# Patient Record
Sex: FEMALE | Race: BLACK OR AFRICAN AMERICAN | NOT HISPANIC OR LATINO | URBAN - METROPOLITAN AREA
[De-identification: names, ages, dates, MRNs, and addresses within clinical notes are randomized per-mention and may not be internally consistent; named-entity substitution may affect disease eponyms.]

---

## 2019-02-19 NOTE — PATIENT DISCUSSION
OPTIC HEMORRHAGE OS - MAYBE DO TO PLATELETS. HYPEREMIA OPTIC DISC OD - DISC PHOTO TAKEN TODAY. WILL DILATE AT NEXT VISIT.

## 2019-03-26 NOTE — PATIENT DISCUSSION
POSTERIOR VITREOUS DETACHMENT, OS: NO HOLES OR TEARS 360' WITH INDIRECT EXAM OU. RETURN FOR FOLLOW-UP AS SCHEDULED.

## 2019-04-16 NOTE — PATIENT DISCUSSION
POSTERIOR VITREOUS DETACHMENT, OS: STABLE. NO HOLES OR TEARS 360' WITH INDIRECT EXAM OU. RETURN FOR FOLLOW-UP AS SCHEDULED.

## 2019-07-26 NOTE — PATIENT DISCUSSION
POSTERIOR VITREOUS DETACHMENT, OU: NO RETINAL HOLES OR TEARS ON SLIT LAMP. CALLBACK INSTRUCTIONS GIVEN. RETURN FOR FOLLOW-UP AS SCHEDULED.

## 2019-07-26 NOTE — PATIENT DISCUSSION
MODERATE DRY EYES: PRESCRIBED PRESERVATIVE FREE ARTIFICIAL TEARS 4-6X A DAY, OU AND THE DAILY INTAKE OF OMEGA-3 DHA/EPA FATTY ACIDS TO HELP RELIEVE SYMPTOMS. ADD NIGHTLY LUBRICATING OINTMENT OR GEL.

## 2021-06-14 ENCOUNTER — IMPORTED ENCOUNTER (OUTPATIENT)
Dept: URBAN - METROPOLITAN AREA CLINIC 9 | Facility: CLINIC | Age: 76
End: 2021-06-14

## 2021-06-23 ENCOUNTER — IMPORTED ENCOUNTER (OUTPATIENT)
Dept: URBAN - METROPOLITAN AREA CLINIC 9 | Facility: CLINIC | Age: 76
End: 2021-06-23

## 2021-06-30 ENCOUNTER — IMPORTED ENCOUNTER (OUTPATIENT)
Dept: URBAN - METROPOLITAN AREA CLINIC 9 | Facility: CLINIC | Age: 76
End: 2021-06-30

## 2021-10-16 ASSESSMENT — VISUAL ACUITY
OD_CC: 20/70 SN
OD_CC: 20/200 SN
OS_CC: 20/70 SN
OS_CC: 20/200 SN
OD_SC: 20/30 SN
OD_PH: 20/100 SN

## 2021-10-16 ASSESSMENT — TONOMETRY
OD_IOP_MMHG: 14
OD_IOP_MMHG: 15
OS_IOP_MMHG: 14

## 2021-10-16 ASSESSMENT — KERATOMETRY
OD_K1POWER_DIOPTERS: 43.25
OD_K2POWER_DIOPTERS: 43.75
OS_K2POWER_DIOPTERS: 44
OS_AXISANGLE_DEGREES: 50
OD_AXISANGLE2_DEGREES: 8
OS_AXISANGLE2_DEGREES: 140
OS_K1POWER_DIOPTERS: 43.75
OD_AXISANGLE_DEGREES: 98

## 2022-05-09 ENCOUNTER — ESTABLISHED PATIENT (OUTPATIENT)
Dept: URBAN - METROPOLITAN AREA CLINIC 14 | Facility: CLINIC | Age: 77
End: 2022-05-09

## 2022-05-09 DIAGNOSIS — H25.12: ICD-10-CM

## 2022-05-09 PROCEDURE — 99214 OFFICE O/P EST MOD 30 MIN: CPT

## 2022-05-09 PROCEDURE — 92136 OPHTHALMIC BIOMETRY: CPT

## 2022-05-09 ASSESSMENT — VISUAL ACUITY
OS_PH: 20/60
OS_BCVA: 20/80
OD_BCVA: 20/20
OS_SC: 20/80
OS_SC: J10
OD_SC: 20/40
OD_SC: J5

## 2022-05-09 ASSESSMENT — KERATOMETRY
OD_AXISANGLE_DEGREES: 90
OS_K1POWER_DIOPTERS: 45.00
OD_K1POWER_DIOPTERS: 43.50
OS_AXISANGLE2_DEGREES: 91
OD_AXISANGLE2_DEGREES: 180
OS_K2POWER_DIOPTERS: 45.25
OD_K2POWER_DIOPTERS: 43.75
OS_AXISANGLE_DEGREES: 1

## 2022-05-09 ASSESSMENT — TONOMETRY
OS_IOP_MMHG: 7
OD_IOP_MMHG: 6

## 2022-06-16 ENCOUNTER — POST-OP (OUTPATIENT)
Dept: URBAN - METROPOLITAN AREA CLINIC 14 | Facility: CLINIC | Age: 77
End: 2022-06-16

## 2022-06-16 DIAGNOSIS — Z96.1: ICD-10-CM

## 2022-06-16 PROCEDURE — 99024 POSTOP FOLLOW-UP VISIT: CPT

## 2022-06-16 ASSESSMENT — KERATOMETRY
OD_K2POWER_DIOPTERS: 43.75
OS_AXISANGLE2_DEGREES: 91
OS_AXISANGLE_DEGREES: 1
OS_K2POWER_DIOPTERS: 45.25
OS_K1POWER_DIOPTERS: 45.00
OD_K1POWER_DIOPTERS: 43.50
OD_AXISANGLE2_DEGREES: 180
OD_AXISANGLE_DEGREES: 90

## 2022-06-16 ASSESSMENT — VISUAL ACUITY
OS_SC: 20/20
OD_SC: 20/30-1

## 2022-06-16 ASSESSMENT — TONOMETRY
OS_IOP_MMHG: 14
OD_IOP_MMHG: 12

## 2022-07-04 RX ORDER — QUETIAPINE FUMARATE 25 MG/1
TABLET, FILM COATED ORAL
COMMUNITY
End: 2022-08-04 | Stop reason: SDUPTHER

## 2022-07-04 RX ORDER — ROSUVASTATIN CALCIUM 40 MG/1
TABLET, COATED ORAL
COMMUNITY
End: 2022-10-20

## 2022-07-04 RX ORDER — DIVALPROEX SODIUM 500 MG/1
TABLET, EXTENDED RELEASE ORAL
COMMUNITY
End: 2022-08-04 | Stop reason: SDUPTHER

## 2022-07-13 PROBLEM — H40.9 GLAUCOMA OF BOTH EYES: Status: ACTIVE | Noted: 2022-07-13

## 2022-07-13 PROBLEM — S82.853A CLOSED TRIMALLEOLAR FRACTURE: Status: ACTIVE | Noted: 2022-07-13

## 2022-07-13 PROBLEM — F31.70 BIPOLAR DISORDER IN FULL REMISSION (HCC): Status: ACTIVE | Noted: 2022-07-13

## 2022-07-13 PROBLEM — E78.00 PURE HYPERCHOLESTEROLEMIA: Status: ACTIVE | Noted: 2022-07-13

## 2022-07-13 PROBLEM — M65.4 DE QUERVAIN'S TENOSYNOVITIS, LEFT: Status: ACTIVE | Noted: 2022-07-13

## 2022-07-13 PROBLEM — D64.9 ABSOLUTE ANEMIA: Status: ACTIVE | Noted: 2022-07-13

## 2022-07-13 PROBLEM — E03.8 SUBCLINICAL HYPOTHYROIDISM: Status: ACTIVE | Noted: 2022-07-13

## 2022-07-13 PROBLEM — E11.9 TYPE 2 DIABETES MELLITUS WITHOUT COMPLICATION, WITHOUT LONG-TERM CURRENT USE OF INSULIN (HCC): Status: ACTIVE | Noted: 2022-07-13

## 2022-07-13 PROBLEM — D50.9 MICROCYTIC ANEMIA: Status: ACTIVE | Noted: 2022-07-13

## 2022-07-13 PROBLEM — F31.9 BIPOLAR 1 DISORDER (HCC): Status: ACTIVE | Noted: 2022-07-13

## 2022-08-17 PROBLEM — S82.853A TRIMALLEOLAR FRACTURE: Status: ACTIVE | Noted: 2017-09-07

## 2022-08-17 PROBLEM — S82.853A TRIMALLEOLAR FRACTURE: Status: RESOLVED | Noted: 2017-09-07 | Resolved: 2022-08-17

## 2022-08-17 PROBLEM — E78.00 HYPERCHOLESTEROLEMIA: Status: ACTIVE | Noted: 2017-09-07

## 2022-08-17 PROBLEM — I95.89 CHRONIC HYPOTENSION: Status: ACTIVE | Noted: 2017-09-07

## 2022-08-17 PROBLEM — F31.70 BIPOLAR DISORDER IN FULL REMISSION (HCC): Status: RESOLVED | Noted: 2022-07-13 | Resolved: 2022-08-17

## 2022-08-17 PROBLEM — E87.6 HYPOKALEMIA: Status: RESOLVED | Noted: 2017-09-07 | Resolved: 2022-08-17

## 2022-08-17 PROBLEM — F31.9 BIPOLAR DISORDER (HCC): Status: RESOLVED | Noted: 2017-09-07 | Resolved: 2022-08-17

## 2022-08-17 PROBLEM — E11.9 DIABETES MELLITUS (HCC): Status: RESOLVED | Noted: 2017-09-07 | Resolved: 2022-08-17

## 2022-08-17 PROBLEM — I10 HYPERTENSION: Status: RESOLVED | Noted: 2022-08-17 | Resolved: 2022-08-17

## 2022-08-17 PROBLEM — I10 HYPERTENSION: Status: ACTIVE | Noted: 2022-08-17

## 2022-08-17 PROBLEM — E87.6 HYPOKALEMIA: Status: ACTIVE | Noted: 2017-09-07

## 2022-08-17 PROBLEM — E11.9 DIABETES MELLITUS (HCC): Status: ACTIVE | Noted: 2017-09-07

## 2022-08-17 PROBLEM — I95.89 CHRONIC HYPOTENSION: Status: RESOLVED | Noted: 2017-09-07 | Resolved: 2022-08-17

## 2022-08-17 PROBLEM — S82.90XA FRACTURE OF LOWER LEG: Status: ACTIVE | Noted: 2017-09-07

## 2022-08-17 PROBLEM — F31.9 BIPOLAR DISORDER (HCC): Status: ACTIVE | Noted: 2017-09-07

## 2022-08-17 PROBLEM — D64.9 ABSOLUTE ANEMIA: Status: RESOLVED | Noted: 2022-07-13 | Resolved: 2022-08-17

## 2022-08-17 PROBLEM — E78.00 HYPERCHOLESTEROLEMIA: Status: RESOLVED | Noted: 2017-09-07 | Resolved: 2022-08-17
